# Patient Record
Sex: MALE | HISPANIC OR LATINO | Employment: FULL TIME | URBAN - METROPOLITAN AREA
[De-identification: names, ages, dates, MRNs, and addresses within clinical notes are randomized per-mention and may not be internally consistent; named-entity substitution may affect disease eponyms.]

---

## 2017-12-21 ENCOUNTER — HOSPITAL ENCOUNTER (EMERGENCY)
Facility: MEDICAL CENTER | Age: 27
End: 2017-12-21
Attending: EMERGENCY MEDICINE
Payer: COMMERCIAL

## 2017-12-21 VITALS
TEMPERATURE: 98.3 F | BODY MASS INDEX: 25.37 KG/M2 | HEART RATE: 60 BPM | OXYGEN SATURATION: 100 % | DIASTOLIC BLOOD PRESSURE: 50 MMHG | RESPIRATION RATE: 18 BRPM | SYSTOLIC BLOOD PRESSURE: 126 MMHG | HEIGHT: 71 IN | WEIGHT: 181.22 LBS

## 2017-12-21 DIAGNOSIS — R20.2 TINGLING: ICD-10-CM

## 2017-12-21 DIAGNOSIS — J01.10 ACUTE FRONTAL SINUSITIS, RECURRENCE NOT SPECIFIED: ICD-10-CM

## 2017-12-21 PROCEDURE — 99283 EMERGENCY DEPT VISIT LOW MDM: CPT

## 2017-12-21 RX ORDER — AZITHROMYCIN 250 MG/1
TABLET, FILM COATED ORAL
Qty: 6 TAB | Refills: 0 | Status: SHIPPED | OUTPATIENT
Start: 2017-12-21 | End: 2017-12-27

## 2017-12-21 NOTE — ED PROVIDER NOTES
ED Provider Note    Scribed for Saúl Torres M.D. by Edilma Pickard. 12/21/2017  2:52 PM    Primary care provider: Pcp Pt states none  Means of arrival: Walk-in  History obtained from: Patient  History limited by: None    CHIEF COMPLAINT  Chief Complaint   Patient presents with   • Congestion     nasal and sinus   • Tingling       HPI  Tushar Celis Jr. is a 27 y.o. male who presents to the Emergency Department for nasal and sinus congestion onset 8 months ago. The patient reports using sinus rinses without any relief, but has not been taking any OTC medications. He reports of experiencing intermittent left-sided facial tingling after eating. He also reports of changes in smell and intermittent loss of smell to the left side and dizziness when standing up too quickly. He denies any denies headache or facial pain. The patient has his first appointment with PCP in February 2018.     REVIEW OF SYSTEMS  Pertinent positives include congestion, left-sided facial tingling, loss of smell, and dizziness. Pertinent negatives include no headache or facial pain.  E.       PAST MEDICAL HISTORY  The patient has no chronic medical history.    SURGICAL HISTORY  patient denies any surgical history    SOCIAL HISTORY  Social History   Substance Use Topics   • Smoking status: Never Smoker   • Alcohol use No      History   Drug Use No       FAMILY HISTORY  Family History   Problem Relation Age of Onset   • Hypertension Mother        CURRENT MEDICATIONS  No current facility-administered medications for this encounter.     Current Outpatient Prescriptions:   •  azithromycin (ZITHROMAX) 250 MG Tab, Take two tabs by mouth on day one, then one tab by mouth daily on days 2-5., Disp: 6 Tab, Rfl: 0  •  selenium sulfide 2.5% (SELSUN) 2.5 % LOTN, Apply  to affected area(s). Apply to affected area for 10 minutes/day for 7 days., Disp: 1 Bottle, Rfl: 0  •  omeprazole (PRILOSEC) 20 MG CPDR, Take 1 Cap by mouth every day., Disp: 30 Cap, Rfl:  "2    ALLERGIES  No Known Allergies    PHYSICAL EXAM  VITAL SIGNS: /50   Pulse 60   Temp 36.8 °C (98.3 °F) (Temporal)   Resp 18   Ht 1.803 m (5' 11\")   Wt 82.2 kg (181 lb 3.5 oz)   SpO2 100%   BMI 25.27 kg/m²     Constitutional: Well developed, Well nourished, no distress, Non-toxic appearance.   HENT: Normocephalic, Atraumatic. No sinus pressure. Bilateral TM normal. Oropharynx moist with posterior nasal drainage.   Eyes: PERRL, EOMI, Conjunctiva normal, No discharge.   CV: Good pulses  Thorax & Lungs: No respiratory distress.   Skin: Warm, Dry, No erythema, No rash.    Musculoskeletal: No major deformities noted.   Neurologic: Awake, alert. Moves all extremities spontaneously.  Psychiatric: Affect normal, Mood normal.    COURSE & MEDICAL DECISION MAKING  Nursing notes, VS, PMSFHx reviewed in chart.    3:13 PM - Patient seen and examined at bedside. Recommended the patient take Afrin and Claritin for symptomatic relief. He will be given a prescription for Zithromax. The patient will be discharged and should return if symptoms worsen or if new symptoms arise. The patient understands and agrees to plan.      3:17 PM I consulted with ED  who agreed to help establish the patient a PCP and schedule an MRI-brain.     Decision Making:  Patient with nonspecific nasal congestion, left sided facial and scalp tingling and paresthesias, clinically I believe the patient has a sinusitis with treat the patient with azithromycin, have scheduled the patient for outpatient primary care physician and outpatient MRI due the patient's change in smell and paresthesias to rule out intracranial mass.    The patient will return for new or worsening symptoms and is stable at the time of discharge.    The patient is referred to a primary physician for blood pressure management, diabetic screening, and for all other preventative health concerns.    DISPOSITION:  Patient will be discharged home in stable condition.  FOLLOW " UP:  [facility], Emergency Dept  [address]  [place] [address]  [phone]    If symptoms worsen      OUTPATIENT MEDICATION:  Discharge Medication List as of 12/21/2017  3:26 PM      START taking these medications    Details   azithromycin (ZITHROMAX) 250 MG Tab Take two tabs by mouth on day one, then one tab by mouth daily on days 2-5., Disp-6 Tab, R-0, Normal             FINAL IMPRESSION  1. Acute frontal sinusitis, recurrence not specified    2. Tingling       IEdilma (Scribe), am scribing for, and in the presence of, Saúl Torres M.D..    Electronically signed by: Edilma Pickard (Kaylie), 12/21/2017    ISaúl M.D. personally performed the services described in this documentation, as scribed by Edilma Pickard in my presence, and it is both accurate and complete.    The note accurately reflects work and decisions made by me.  Saúl Torres  12/21/2017  5:07 PM

## 2017-12-21 NOTE — DISCHARGE INSTRUCTIONS
Please follow-up with your primary care provider for blood pressure management.         Sinusitis, Adult  Sinusitis is redness, soreness, and puffiness (inflammation) of the air pockets in the bones of your face (sinuses). The redness, soreness, and puffiness can cause air and mucus to get trapped in your sinuses. This can allow germs to grow and cause an infection.   HOME CARE   · Drink enough fluids to keep your pee (urine) clear or pale yellow.  · Use a humidifier in your home.  · Run a hot shower to create steam in the bathroom. Sit in the bathroom with the door closed. Breathe in the steam 3-4 times a day.  · Put a warm, moist washcloth on your face 3-4 times a day, or as told by your doctor.  · Use salt water sprays (saline sprays) to wet the thick fluid in your nose. This can help the sinuses drain.  · Only take medicine as told by your doctor.  GET HELP RIGHT AWAY IF:   · Your pain gets worse.  · You have very bad headaches.  · You are sick to your stomach (nauseous).  · You throw up (vomit).  · You are very sleepy (drowsy) all the time.  · Your face is puffy (swollen).  · Your vision changes.  · You have a stiff neck.  · You have trouble breathing.  MAKE SURE YOU:   · Understand these instructions.  · Will watch your condition.  · Will get help right away if you are not doing well or get worse.     This information is not intended to replace advice given to you by your health care provider. Make sure you discuss any questions you have with your health care provider.     Document Released: 06/05/2009 Document Revised: 01/08/2016 Document Reviewed: 07/23/2013  [employer] Interactive Patient Education ©2016 [employer]

## 2017-12-21 NOTE — LETTER
Emergency Services     December 21, 2017    Patient: Tushar Celis Jr.   YOB: 1990   Date of Visit: 12/21/2017       To Whom It May Concern:    Tushar Celis was seen and treated in our emergency department on 12/21/2017. He   Is to be excused from work today and tomorrow if needed  .    Sincerely,     MARLY BOLDEN M.D.  [facility], EMERGENCY DEPT  Dept: [phone]

## 2017-12-21 NOTE — ED NOTES
Pt discharged with instructions and scrip x 1  Pt verbalizes the understanding of instructions. Pt ambulated out of ER without any difficulty.

## 2017-12-21 NOTE — ED NOTES
Chief Complaint   Patient presents with   • Congestion     nasal and sinus   • Tingling     Pt states sx for months

## 2017-12-27 ENCOUNTER — OFFICE VISIT (OUTPATIENT)
Dept: MEDICAL GROUP | Facility: MEDICAL CENTER | Age: 27
End: 2017-12-27
Payer: COMMERCIAL

## 2017-12-27 VITALS
TEMPERATURE: 97.4 F | WEIGHT: 181 LBS | HEART RATE: 78 BPM | HEIGHT: 71 IN | RESPIRATION RATE: 16 BRPM | SYSTOLIC BLOOD PRESSURE: 126 MMHG | OXYGEN SATURATION: 96 % | BODY MASS INDEX: 25.34 KG/M2 | DIASTOLIC BLOOD PRESSURE: 72 MMHG

## 2017-12-27 DIAGNOSIS — R63.4 WEIGHT LOSS: ICD-10-CM

## 2017-12-27 DIAGNOSIS — Z11.3 SCREEN FOR STD (SEXUALLY TRANSMITTED DISEASE): ICD-10-CM

## 2017-12-27 DIAGNOSIS — R20.2 TINGLING: ICD-10-CM

## 2017-12-27 DIAGNOSIS — K29.00 ACUTE GASTRITIS WITHOUT HEMORRHAGE, UNSPECIFIED GASTRITIS TYPE: ICD-10-CM

## 2017-12-27 PROCEDURE — 99203 OFFICE O/P NEW LOW 30 MIN: CPT | Performed by: NURSE PRACTITIONER

## 2017-12-27 RX ORDER — OMEPRAZOLE 20 MG/1
20 CAPSULE, DELAYED RELEASE ORAL DAILY
Qty: 21 CAP | Refills: 0 | Status: SHIPPED | OUTPATIENT
Start: 2017-12-27 | End: 2018-01-17

## 2017-12-27 ASSESSMENT — ENCOUNTER SYMPTOMS
TINGLING: 1
HEARTBURN: 1
WEIGHT LOSS: 1

## 2017-12-27 ASSESSMENT — PATIENT HEALTH QUESTIONNAIRE - PHQ9: CLINICAL INTERPRETATION OF PHQ2 SCORE: 0

## 2017-12-27 NOTE — PROGRESS NOTES
"Subjective:      Tushar Celis Jr. is a 27 y.o. male who presents with Establish Care and Hospital Follow-up (brain tingling)            HPI Tushar Celis Jr.Is here today to establish care as well as for hospital follow-up for sinusitis and had tingling as well as concerns about weight loss and digestion issues.      1. Tingling  Patient was seen at the emergency room last week for complaints of tingling on the left side of his head. An MRI was ordered but it is awaiting insurance approval. He states he still has the tingling which comes and goes. He is wearing a hat in the office. He has some sinus congestion and finished a Z-Ivan but has not noticed a difference. He denies vision changes, speech changes, confusion or unilateral weakness. He reports no rash in the area.    2. Weight loss  Patient states he has lost weight over the last year and thinks it is due to not eating as much with his indigestion. Review of his chart shows his weight is actually up from many years ago and he states he was 168 pounds and went as high as 223 pounds but now is at 181 pounds. He denies any bleeding anywhere, abdominal pain, nausea or vomiting.    3. Acute gastritis without hemorrhage, unspecified gastritis type  Patient states he feels like he has a digestion issue which has been present for years. He describes it as \"no air in his stomach\" he also describes it as feeling tight. It comes and goes and he does not know what makes it occur and it will got better on its own. It appears he has been prescribed omeprazole in the past but has not used it. He denies black stools or blood in his stool.    4. Screen for STD (sexually transmitted disease)  Patient states he would like to be screened for STDs although he has not recently been sexually active and has no penile discharge.  Social History   Substance Use Topics   • Smoking status: Never Smoker   • Smokeless tobacco: Never Used   • Alcohol use Yes      Comment: occas. " "    Current Outpatient Prescriptions   Medication Sig Dispense Refill   • omeprazole (PRILOSEC) 20 MG delayed-release capsule Take 1 Cap by mouth every day for 21 days. 21 Cap 0   • selenium sulfide 2.5% (SELSUN) 2.5 % LOTN Apply  to affected area(s). Apply to affected area for 10 minutes/day for 7 days. 1 Bottle 0   • omeprazole (PRILOSEC) 20 MG CPDR Take 1 Cap by mouth every day. 30 Cap 2     No current facility-administered medications for this visit.      Family History   Problem Relation Age of Onset   • Hypertension Mother    • No Known Problems Father    History reviewed. No pertinent past medical history.    Review of Systems   Constitutional: Positive for weight loss.   Gastrointestinal: Positive for heartburn.   Neurological: Positive for tingling.   All other systems reviewed and are negative.         Objective:     /72   Pulse 78   Temp 36.3 °C (97.4 °F)   Resp 16   Ht 1.803 m (5' 11\")   Wt 82.1 kg (181 lb)   SpO2 96%   BMI 25.24 kg/m²      Physical Exam   Constitutional: He is oriented to person, place, and time. He appears well-developed and well-nourished. No distress.   HENT:   Head: Normocephalic and atraumatic.   Right Ear: External ear normal.   Left Ear: External ear normal.   Nose: Nose normal.   Mouth/Throat: Oropharynx is clear and moist.   Eyes: Conjunctivae are normal. Right eye exhibits no discharge. Left eye exhibits no discharge.   Neck: Normal range of motion. Neck supple. No tracheal deviation present. No thyromegaly present.   Cardiovascular: Normal rate, regular rhythm and normal heart sounds.    No murmur heard.  Pulmonary/Chest: Effort normal and breath sounds normal. No respiratory distress. He has no wheezes. He has no rales.   Abdominal: Soft. Bowel sounds are normal. He exhibits no distension and no mass. There is no tenderness. There is no rebound and no guarding. No hernia.   Lymphadenopathy:     He has no cervical adenopathy.   Neurological: He is alert and " oriented to person, place, and time. Coordination normal.   Skin: Skin is warm and dry. No rash noted. He is not diaphoretic. No erythema.   Patient points to the left side of his head is to wear the tingling occurs and he is wearing a wool In the office. No rash noted.   Psychiatric: He has a normal mood and affect. His behavior is normal. Judgment and thought content normal.   Nursing note and vitals reviewed.              Assessment/Plan:     1. Tingling  Patient has an MRI pending and this will be good to rule out sinusitis or other cause of his symptoms. I advised him to stop wearing his wool cap since the pulling on the hair can contribute to tingling. I will advise him further pending results.    2. Weight loss  It is unclear how much weight patient has lost and much of it seems to be due to decreased appetite. His BMI is appropriate. I recommended some lab work and he is doing his MRI. If symptoms continue he may need a CT of the abdomen with contrast next.  - COMP METABOLIC PANEL; Future  - TSH; Future  - CBC WITHOUT DIFFERENTIAL; Future    3. Acute gastritis without hemorrhage, unspecified gastritis type  I will have patient try omeprazole to see if this makes a difference and if it does not, he may need CT and GI referral.  - omeprazole (PRILOSEC) 20 MG delayed-release capsule; Take 1 Cap by mouth every day for 21 days.  Dispense: 21 Cap; Refill: 0    4. Screen for STD (sexually transmitted disease)    - HIV ANTIBODIES; Future  - RPR

## 2017-12-29 ENCOUNTER — HOSPITAL ENCOUNTER (OUTPATIENT)
Dept: LAB | Facility: MEDICAL CENTER | Age: 27
End: 2017-12-29
Attending: NURSE PRACTITIONER
Payer: COMMERCIAL

## 2017-12-29 DIAGNOSIS — Z11.3 SCREEN FOR STD (SEXUALLY TRANSMITTED DISEASE): ICD-10-CM

## 2017-12-29 DIAGNOSIS — R63.4 WEIGHT LOSS: ICD-10-CM

## 2017-12-29 LAB
ALBUMIN SERPL BCP-MCNC: 4.5 G/DL (ref 3.2–4.9)
ALBUMIN/GLOB SERPL: 1.9 G/DL
ALP SERPL-CCNC: 64 U/L (ref 30–99)
ALT SERPL-CCNC: 20 U/L (ref 2–50)
ANION GAP SERPL CALC-SCNC: 6 MMOL/L (ref 0–11.9)
AST SERPL-CCNC: 24 U/L (ref 12–45)
BILIRUB SERPL-MCNC: 0.8 MG/DL (ref 0.1–1.5)
BUN SERPL-MCNC: 10 MG/DL (ref 8–22)
CALCIUM SERPL-MCNC: 9.8 MG/DL (ref 8.5–10.5)
CHLORIDE SERPL-SCNC: 102 MMOL/L (ref 96–112)
CO2 SERPL-SCNC: 28 MMOL/L (ref 20–33)
CREAT SERPL-MCNC: 0.92 MG/DL (ref 0.5–1.4)
ERYTHROCYTE [DISTWIDTH] IN BLOOD BY AUTOMATED COUNT: 39.9 FL (ref 35.9–50)
GFR SERPL CREATININE-BSD FRML MDRD: >60 ML/MIN/1.73 M 2
GLOBULIN SER CALC-MCNC: 2.4 G/DL (ref 1.9–3.5)
GLUCOSE SERPL-MCNC: 93 MG/DL (ref 65–99)
HCT VFR BLD AUTO: 47.5 % (ref 42–52)
HGB BLD-MCNC: 16.6 G/DL (ref 14–18)
HIV 1+2 AB+HIV1 P24 AG SERPL QL IA: NON REACTIVE
MCH RBC QN AUTO: 30.9 PG (ref 27–33)
MCHC RBC AUTO-ENTMCNC: 34.9 G/DL (ref 33.7–35.3)
MCV RBC AUTO: 88.5 FL (ref 81.4–97.8)
PLATELET # BLD AUTO: 186 K/UL (ref 164–446)
PMV BLD AUTO: 11.2 FL (ref 9–12.9)
POTASSIUM SERPL-SCNC: 4.2 MMOL/L (ref 3.6–5.5)
PROT SERPL-MCNC: 6.9 G/DL (ref 6–8.2)
RBC # BLD AUTO: 5.37 M/UL (ref 4.7–6.1)
SODIUM SERPL-SCNC: 136 MMOL/L (ref 135–145)
TREPONEMA PALLIDUM IGG+IGM AB [PRESENCE] IN SERUM OR PLASMA BY IMMUNOASSAY: NON REACTIVE
WBC # BLD AUTO: 7.4 K/UL (ref 4.8–10.8)

## 2017-12-29 PROCEDURE — 36415 COLL VENOUS BLD VENIPUNCTURE: CPT

## 2017-12-29 PROCEDURE — 87389 HIV-1 AG W/HIV-1&-2 AB AG IA: CPT

## 2017-12-29 PROCEDURE — 86780 TREPONEMA PALLIDUM: CPT

## 2017-12-29 PROCEDURE — 80053 COMPREHEN METABOLIC PANEL: CPT

## 2017-12-29 PROCEDURE — 84443 ASSAY THYROID STIM HORMONE: CPT

## 2017-12-29 PROCEDURE — 85027 COMPLETE CBC AUTOMATED: CPT

## 2017-12-30 LAB — TSH SERPL DL<=0.005 MIU/L-ACNC: 3.13 UIU/ML (ref 0.38–5.33)

## 2018-01-19 ENCOUNTER — TELEPHONE (OUTPATIENT)
Dept: MEDICAL GROUP | Facility: MEDICAL CENTER | Age: 28
End: 2018-01-19

## 2018-01-19 DIAGNOSIS — R19.5 ABNORMAL STOOL TEST: ICD-10-CM

## 2018-01-19 NOTE — TELEPHONE ENCOUNTER
"1. Caller Name: Tushar Celis                 Call Back [phone]    Patient called, he said he would like you to order a stool testing because he notice something in his stool that he says is \"not normal stool\" and he is concerned that a microorganism is eating him alive from the inside.... Also he said he still feel the tingling in his brain and would like more antibiotic to be sent to his pharmacy.        Patient approves a detailed voicemail message: N\A    "

## 2018-01-19 NOTE — TELEPHONE ENCOUNTER
I have ordered stool testing and he needs to go to the laboratory to  the stool kit and complete the test. I did not refill Zithromax or any other antibiotic. If he feels he is still ill he will need to go back to urgent care.

## 2018-01-22 ENCOUNTER — OFFICE VISIT (OUTPATIENT)
Dept: MEDICAL GROUP | Facility: MEDICAL CENTER | Age: 28
End: 2018-01-22
Payer: COMMERCIAL

## 2018-01-22 VITALS
BODY MASS INDEX: 25.34 KG/M2 | WEIGHT: 181 LBS | DIASTOLIC BLOOD PRESSURE: 68 MMHG | TEMPERATURE: 97.2 F | RESPIRATION RATE: 16 BRPM | HEART RATE: 72 BPM | SYSTOLIC BLOOD PRESSURE: 124 MMHG | HEIGHT: 71 IN | OXYGEN SATURATION: 97 %

## 2018-01-22 DIAGNOSIS — R10.84 GENERALIZED ABDOMINAL PAIN: ICD-10-CM

## 2018-01-22 PROCEDURE — 99213 OFFICE O/P EST LOW 20 MIN: CPT | Performed by: NURSE PRACTITIONER

## 2018-01-22 ASSESSMENT — ENCOUNTER SYMPTOMS
WEIGHT LOSS: 1
NERVOUS/ANXIOUS: 1
HEARTBURN: 1

## 2018-01-22 NOTE — PROGRESS NOTES
Subjective:      Tushar Celis Jr. is a 28 y.o. male who presents with Follow-Up (worm in stool / white )        CC: Patient is here today for concerns about parasites and abdominal discomfort.    HPI Tushar Celis Jr. was seen here initially after emergency room visit for tingling of the scalp. Nothing specific was found at the emergency room and MRI was ordered but has not been completed as of yet. His main concern today is that he has continued digestion issues for years. Review of chart shows that he had a CT of the abdomen and ultrasound of the abdomen done in 2011 with no findings abnormal. I also did lab work on patient and results of CBC, TSH, CMP were all normal. HIV and syphilis were negative.    Patient is still concerned that he feels like something is eating him up from the inside out. On his last visit he mentioned that it felt like he had no air in his stomach and recently asked for a stool testing because his stool did not appear normal and he was afraid that a microorganism was eating him alive. He states today that he read a large percentage of people have parasites in them. A stool test was ordered for him but it was not completed. He denies fever, chills, nausea or vomiting. Vital signs are normal.        Social History   Substance Use Topics   • Smoking status: Never Smoker   • Smokeless tobacco: Never Used   • Alcohol use Yes      Comment: occas.     Current Outpatient Prescriptions   Medication Sig Dispense Refill   • selenium sulfide 2.5% (SELSUN) 2.5 % LOTN Apply  to affected area(s). Apply to affected area for 10 minutes/day for 7 days. 1 Bottle 0   • omeprazole (PRILOSEC) 20 MG CPDR Take 1 Cap by mouth every day. 30 Cap 2     No current facility-administered medications for this visit.      Family History   Problem Relation Age of Onset   • Hypertension Mother    • No Known Problems Father    History reviewed. No pertinent past medical history.    Review of Systems   Constitutional:  "Positive for malaise/fatigue and weight loss.   Gastrointestinal: Positive for heartburn.   Psychiatric/Behavioral: The patient is nervous/anxious.    All other systems reviewed and are negative.         Objective:     /68   Pulse 72   Temp 36.2 °C (97.2 °F)   Resp 16   Ht 1.803 m (5' 11\")   Wt 82.1 kg (181 lb)   SpO2 97%   BMI 25.24 kg/m²      Physical Exam   Constitutional: He appears well-developed and well-nourished. No distress.   HENT:   Head: Normocephalic and atraumatic.   Right Ear: External ear normal.   Left Ear: External ear normal.   Skin: He is not diaphoretic.   Psychiatric: He has a normal mood and affect. His speech is normal.   Patient appears somewhat anxious and paranoid over his health status.               Assessment/Plan:     1. Generalized abdominal pain  I reviewed with patient that his lab work came back all normal and he seemed disappointed with this and told me that his potassium was high but I reviewed with him the results showing normal potassium. I also reviewed with him his previous CT and ultrasound of the abdomen which were normal although he states he was told that his stomach looked green from his previous test results. I reviewed with him that it would be unusual to see parasites in the stool in this area unless he traveled out of the country or was in a questionable water source but was willing to do stool testing and ultrasound of the abdomen if necessary. He did ask about whether we did alternative medicine which I explained was not something I or my colleagues practice here. He agreed that it would be better for him to go to the [facility] to look into alternative means of testing looking into his complaints. I told him he was welcome back any time for follow-up on his issues from a more traditional medical overview.      "

## 2018-01-22 NOTE — LETTER
January 22, 2018         Patient: Tushar Celis Jr.   YOB: 1990   Date of Visit: 1/22/2018           To Whom it May Concern:    Tushar Celis was seen in my clinic on 1/22/2018. He may return to work on 1/23/18..    If you have any questions or concerns, please don't hesitate to call.        Sincerely,           BILLY Tena.  Electronically Signed

## 2018-01-31 ENCOUNTER — HOSPITAL ENCOUNTER (OUTPATIENT)
Dept: RADIOLOGY | Facility: MEDICAL CENTER | Age: 28
End: 2018-01-31
Attending: EMERGENCY MEDICINE
Payer: COMMERCIAL

## 2018-01-31 ENCOUNTER — OFFICE VISIT (OUTPATIENT)
Dept: MEDICAL GROUP | Facility: MEDICAL CENTER | Age: 28
End: 2018-01-31
Payer: COMMERCIAL

## 2018-01-31 VITALS
BODY MASS INDEX: 23.8 KG/M2 | HEIGHT: 71 IN | WEIGHT: 170 LBS | DIASTOLIC BLOOD PRESSURE: 84 MMHG | OXYGEN SATURATION: 98 % | TEMPERATURE: 98.6 F | HEART RATE: 79 BPM | SYSTOLIC BLOOD PRESSURE: 122 MMHG

## 2018-01-31 DIAGNOSIS — R63.4 WEIGHT LOSS: ICD-10-CM

## 2018-01-31 DIAGNOSIS — R20.2 TINGLING: ICD-10-CM

## 2018-01-31 DIAGNOSIS — R19.7 DIARRHEA, UNSPECIFIED TYPE: ICD-10-CM

## 2018-01-31 PROCEDURE — 70551 MRI BRAIN STEM W/O DYE: CPT

## 2018-01-31 PROCEDURE — 99214 OFFICE O/P EST MOD 30 MIN: CPT | Performed by: PHYSICIAN ASSISTANT

## 2018-01-31 NOTE — PROGRESS NOTES
Subjective:   CC: Tushar Celis Jr. is a 28 y.o. male here today to follow-up on the following concerns and possibly establishing with a new provider:    Patient complains of brain tingling which comes and goes for the past year.  Tingling is over temporal regions of the brain and he gets chills when this happens without any other neurological symptoms such as motor or sensory weakness. Patient was seen for this at the ER and a brain MRI was ordered that patient is going to get done today. Also has been seen with another provider in the past for the same problems. Denies any headaches or change in vision.    Patient also complains of upset stomach and diarrhea on and off for a year. States he used to eat his lettuce without washing and now he is concerned that he has contracted a parasite. States has been eating garlic for the past week to kill the parasite. States he has other weird symptoms such as when he eats he feels more hungry and wants to eat more, however sometimes he eats and he feels full.  Patient has had some weight loss, particularly he has 11 lb wt loss since last office visit (part of it could be change in scales). States his normal weight is around 175- 180. Denies any blood in stool.      Current medicines (including changes today)  Current Outpatient Prescriptions   Medication Sig Dispense Refill   • Probiotic Product (PROBIOTIC-10) Cap Take  by mouth.     • SACCHAROMYCES BOULARDII PO Take  by mouth.     • Iron-Vitamin C (IRON 100/C PO) Take  by mouth.     • selenium sulfide 2.5% (SELSUN) 2.5 % LOTN Apply  to affected area(s). Apply to affected area for 10 minutes/day for 7 days. 1 Bottle 0   • omeprazole (PRILOSEC) 20 MG CPDR Take 1 Cap by mouth every day. 30 Cap 2     No current facility-administered medications for this visit.          Past medical, surgical, family, and social history are reviewed in Epic chart by me today.   Medications and allergies reviewed in Epic chart by me today.  "        ROS   No chest pain, no shortness of breath, no abdominal pain  As documented in history of present illness above     Objective:     Blood pressure 122/84, pulse 79, temperature 37 °C (98.6 °F), height 1.803 m (5' 10.98\"), weight 77.1 kg (170 lb), SpO2 98 %. Body mass index is 23.72 kg/m².   Physical Exam:  Constitutional: Alert, oriented in no acute distress.  Psych: Eye contact is good, changes from subject to subject, calm affect, demonstrate some odd thinking   Lungs: Unlabored respiratory effort, clear to auscultation bilaterally with good excursion, no wheez or rhonci  CV: regular rate and rhythm. No lower extremity edema  Abdomen: soft, nontender, No CVAT  Skin: no lesions in visible areas.    Last lab results were reviewed by me today and discussed w patient.        Assessment and Plan:   The following treatment plan was discussed    1. Tingling  Advised to do the brain MRI    2. Diarrhea, unspecified type  Patient is currently taking a probiotic and a prebiotic,  Possible IBS  Patient has an order to get ova parasite and stool testing  - REFERRAL TO GASTROENTEROLOGY    3. Weight loss  Last lab results were reviewed by me today and discussed w patient.  I am not sure what is causing the recent weight loss.    Patient complains of unspecific symptoms. He demonstrated some odd thinking. States he wants to switch providers because his previous provider told him that he is perfect, states perfect is not acceptable, maybe he is okay but not perfect.    Reassured patient that so far his lab results look normal. Patient is going to do the brain MRI and stool study for ova parasite. Follow-up with gastroenterology.  Patient is going to establish with an M.D.      Followup: Return if symptoms worsen or fail to improve.         Please note that this dictation was created using voice recognition software. I have made every reasonable attempt to correct obvious errors, but I expect that there are errors of " grammar and possibly content that I did not discover before finalizing the note.

## 2018-02-01 ENCOUNTER — HOSPITAL ENCOUNTER (OUTPATIENT)
Facility: MEDICAL CENTER | Age: 28
End: 2018-02-01
Attending: NURSE PRACTITIONER
Payer: COMMERCIAL

## 2018-02-01 DIAGNOSIS — R19.5 ABNORMAL STOOL TEST: ICD-10-CM

## 2018-02-01 PROCEDURE — 87177 OVA AND PARASITES SMEARS: CPT

## 2018-02-08 LAB
O+P SPEC MICRO: NORMAL
SIGNIFICANT IND 70042: NORMAL
SITE SITE: NORMAL
SOURCE SOURCE: NORMAL

## 2018-02-28 DIAGNOSIS — J34.89 NASAL DISCHARGE: ICD-10-CM

## 2018-03-02 DIAGNOSIS — B89 PARASITE INFECTION: ICD-10-CM

## 2018-03-22 ENCOUNTER — APPOINTMENT (OUTPATIENT)
Dept: RADIOLOGY | Facility: MEDICAL CENTER | Age: 28
End: 2018-03-22
Attending: SPECIALIST
Payer: COMMERCIAL

## 2018-05-30 ENCOUNTER — HOSPITAL ENCOUNTER (EMERGENCY)
Facility: MEDICAL CENTER | Age: 28
End: 2018-05-30
Attending: EMERGENCY MEDICINE
Payer: COMMERCIAL

## 2018-05-30 ENCOUNTER — APPOINTMENT (OUTPATIENT)
Dept: RADIOLOGY | Facility: MEDICAL CENTER | Age: 28
End: 2018-05-30
Attending: EMERGENCY MEDICINE
Payer: COMMERCIAL

## 2018-05-30 VITALS
WEIGHT: 175.04 LBS | SYSTOLIC BLOOD PRESSURE: 130 MMHG | DIASTOLIC BLOOD PRESSURE: 75 MMHG | TEMPERATURE: 98 F | OXYGEN SATURATION: 98 % | RESPIRATION RATE: 18 BRPM | BODY MASS INDEX: 24.51 KG/M2 | HEART RATE: 76 BPM | HEIGHT: 71 IN

## 2018-05-30 DIAGNOSIS — R10.32 ABDOMINAL PAIN, LLQ (LEFT LOWER QUADRANT): ICD-10-CM

## 2018-05-30 LAB
ALBUMIN SERPL BCP-MCNC: 4.6 G/DL (ref 3.2–4.9)
ALBUMIN/GLOB SERPL: 1.7 G/DL
ALP SERPL-CCNC: 87 U/L (ref 30–99)
ALT SERPL-CCNC: 16 U/L (ref 2–50)
AMPHET UR QL SCN: NEGATIVE
ANION GAP SERPL CALC-SCNC: 9 MMOL/L (ref 0–11.9)
APPEARANCE UR: CLEAR
AST SERPL-CCNC: 15 U/L (ref 12–45)
BARBITURATES UR QL SCN: NEGATIVE
BASOPHILS # BLD AUTO: 0.3 % (ref 0–1.8)
BASOPHILS # BLD: 0.02 K/UL (ref 0–0.12)
BENZODIAZ UR QL SCN: NEGATIVE
BILIRUB SERPL-MCNC: 0.7 MG/DL (ref 0.1–1.5)
BILIRUB UR QL STRIP.AUTO: NEGATIVE
BUN SERPL-MCNC: 23 MG/DL (ref 8–22)
BZE UR QL SCN: NEGATIVE
CALCIUM SERPL-MCNC: 9.5 MG/DL (ref 8.5–10.5)
CANNABINOIDS UR QL SCN: POSITIVE
CHLORIDE SERPL-SCNC: 104 MMOL/L (ref 96–112)
CO2 SERPL-SCNC: 24 MMOL/L (ref 20–33)
COLOR UR: YELLOW
CREAT SERPL-MCNC: 1.11 MG/DL (ref 0.5–1.4)
EOSINOPHIL # BLD AUTO: 0.06 K/UL (ref 0–0.51)
EOSINOPHIL NFR BLD: 0.8 % (ref 0–6.9)
ERYTHROCYTE [DISTWIDTH] IN BLOOD BY AUTOMATED COUNT: 39.9 FL (ref 35.9–50)
GLOBULIN SER CALC-MCNC: 2.7 G/DL (ref 1.9–3.5)
GLUCOSE SERPL-MCNC: 81 MG/DL (ref 65–99)
GLUCOSE UR STRIP.AUTO-MCNC: NEGATIVE MG/DL
HCT VFR BLD AUTO: 47.5 % (ref 42–52)
HGB BLD-MCNC: 16.9 G/DL (ref 14–18)
IMM GRANULOCYTES # BLD AUTO: 0.03 K/UL (ref 0–0.11)
IMM GRANULOCYTES NFR BLD AUTO: 0.4 % (ref 0–0.9)
KETONES UR STRIP.AUTO-MCNC: NEGATIVE MG/DL
LEUKOCYTE ESTERASE UR QL STRIP.AUTO: NEGATIVE
LIPASE SERPL-CCNC: 18 U/L (ref 11–82)
LYMPHOCYTES # BLD AUTO: 2.13 K/UL (ref 1–4.8)
LYMPHOCYTES NFR BLD: 29.1 % (ref 22–41)
MCH RBC QN AUTO: 30.9 PG (ref 27–33)
MCHC RBC AUTO-ENTMCNC: 35.6 G/DL (ref 33.7–35.3)
MCV RBC AUTO: 86.8 FL (ref 81.4–97.8)
METHADONE UR QL SCN: NEGATIVE
MICRO URNS: NORMAL
MONOCYTES # BLD AUTO: 0.85 K/UL (ref 0–0.85)
MONOCYTES NFR BLD AUTO: 11.6 % (ref 0–13.4)
NEUTROPHILS # BLD AUTO: 4.24 K/UL (ref 1.82–7.42)
NEUTROPHILS NFR BLD: 57.8 % (ref 44–72)
NITRITE UR QL STRIP.AUTO: NEGATIVE
NRBC # BLD AUTO: 0 K/UL
NRBC BLD-RTO: 0 /100 WBC
OPIATES UR QL SCN: NEGATIVE
OXYCODONE UR QL SCN: NEGATIVE
PCP UR QL SCN: NEGATIVE
PH UR STRIP.AUTO: 5 [PH]
PLATELET # BLD AUTO: 211 K/UL (ref 164–446)
PMV BLD AUTO: 10.9 FL (ref 9–12.9)
POTASSIUM SERPL-SCNC: 4 MMOL/L (ref 3.6–5.5)
PROPOXYPH UR QL SCN: NEGATIVE
PROT SERPL-MCNC: 7.3 G/DL (ref 6–8.2)
PROT UR QL STRIP: NEGATIVE MG/DL
RBC # BLD AUTO: 5.47 M/UL (ref 4.7–6.1)
RBC UR QL AUTO: NEGATIVE
SODIUM SERPL-SCNC: 137 MMOL/L (ref 135–145)
SP GR UR STRIP.AUTO: 1.03
UROBILINOGEN UR STRIP.AUTO-MCNC: 0.2 MG/DL
WBC # BLD AUTO: 7.3 K/UL (ref 4.8–10.8)

## 2018-05-30 PROCEDURE — 80053 COMPREHEN METABOLIC PANEL: CPT

## 2018-05-30 PROCEDURE — 80307 DRUG TEST PRSMV CHEM ANLYZR: CPT

## 2018-05-30 PROCEDURE — 81003 URINALYSIS AUTO W/O SCOPE: CPT

## 2018-05-30 PROCEDURE — 99284 EMERGENCY DEPT VISIT MOD MDM: CPT

## 2018-05-30 PROCEDURE — 700105 HCHG RX REV CODE 258: Performed by: EMERGENCY MEDICINE

## 2018-05-30 PROCEDURE — 85025 COMPLETE CBC W/AUTO DIFF WBC: CPT

## 2018-05-30 PROCEDURE — 36415 COLL VENOUS BLD VENIPUNCTURE: CPT

## 2018-05-30 PROCEDURE — 700117 HCHG RX CONTRAST REV CODE 255: Performed by: EMERGENCY MEDICINE

## 2018-05-30 PROCEDURE — 83690 ASSAY OF LIPASE: CPT

## 2018-05-30 PROCEDURE — 74177 CT ABD & PELVIS W/CONTRAST: CPT

## 2018-05-30 RX ORDER — SODIUM CHLORIDE 9 MG/ML
1000 INJECTION, SOLUTION INTRAVENOUS ONCE
Status: COMPLETED | OUTPATIENT
Start: 2018-05-30 | End: 2018-05-30

## 2018-05-30 RX ADMIN — SODIUM CHLORIDE 1000 ML: 9 INJECTION, SOLUTION INTRAVENOUS at 07:30

## 2018-05-30 RX ADMIN — IOHEXOL 100 ML: 350 INJECTION, SOLUTION INTRAVENOUS at 08:33

## 2018-05-30 ASSESSMENT — PAIN SCALES - GENERAL
PAINLEVEL_OUTOF10: 10
PAINLEVEL_OUTOF10: 10

## 2018-05-30 NOTE — ED TRIAGE NOTES
"Tushar Celis Jr.  28 y.o.  Chief Complaint   Patient presents with   • LLQ Pain     x weeks, \"it feels sticky\"   • Loss of Appetite     concurrent with pain   • Constipation     LBM 5/27/18     Ambulatory to triage with steady gait for above. Currently rates pain 10/10. No behavioral pain indicators noted.    Denies N/V/D. Asking in triage \"what would be the possibility of getting a CT right now? If they're not going to give me a CT there's no point in me being here.\"    Patient has complained of diarrhea to his PCP in the past - was referred to a Gastroenterologist, last visit 2-3 months ago. Was told to call if any symptoms worsened - states that he did not call today.    Triage process explained to patient, apologized for wait time, and returned to lobby.  "

## 2018-05-30 NOTE — DISCHARGE INSTRUCTIONS
Please follow up with a primary physician for blood pressure management, diabetic screening, and all other preventive health concerns.      Acute Pain, Adult  Acute pain is a type of pain that may last for just a few days or as long as six months. It is often related to an illness, injury, or medical procedure. Acute pain may be mild, moderate, or severe. It usually goes away once your injury has healed or you are no longer ill.  Pain can make it hard for you to do daily activities. It can cause anxiety and lead to other problems if left untreated. Treatment depends on the cause and severity of your acute pain.  Follow these instructions at home:  · Check your pain level as told by your health care provider.  · Take over-the-counter and prescription medicines only as told by your health care provider.  · If you are taking prescription pain medicine:  ¨ Ask your health care provider about taking a stool softener or laxative to prevent constipation.  ¨ Do not stop taking the medicine suddenly. Talk to your health care provider about how and when to discontinue prescription pain medicine.  ¨ If your pain is severe, do not take more pills than instructed by your health care provider.  ¨ Do not take other over-the-counter pain medicines in addition to this medicine unless told by your health care provider.  ¨ Do not drive or operate heavy machinery while taking prescription pain medicine.  · Apply ice or heat as told by your health care provider. These may reduce swelling and pain.  · Ask your health care provider if other strategies such as distraction, relaxation, or physical therapies can help your pain.  · Keep all follow-up visits as told by your health care provider. This is important.  Contact a health care provider if:  · You have pain that is not controlled by medicine.  · Your pain does not improve or gets worse.  · You have side effects from pain medicines, such as vomiting or confusion.  Get help right away  if:  · You have severe pain.  · You have trouble breathing.  · You lose consciousness.  · You have chest pain or pressure that lasts for more than a few minutes. Along with the chest pain you may:  ¨ Have pain or discomfort in one or both arms, your back, neck, jaw, or stomach.  ¨ Have shortness of breath.  ¨ Break out in a cold sweat.  ¨ Feel nauseous.  ¨ Become light-headed.  These symptoms may represent a serious problem that is an emergency. Do not wait to see if the symptoms will go away. Get medical help right away. Call your local emergency services (911 in the [place] Do not drive yourself to the hospital.   This information is not intended to replace advice given to you by your health care provider. Make sure you discuss any questions you have with your health care provider.  Document Released: 01/01/2017 Document Revised: 05/26/2017 Document Reviewed: 01/01/2017  [employer]vier Interactive Patient Education © 2017 [employer]

## 2018-06-15 ENCOUNTER — TELEPHONE (OUTPATIENT)
Dept: MEDICAL GROUP | Facility: MEDICAL CENTER | Age: 28
End: 2018-06-15

## 2018-06-29 ENCOUNTER — OFFICE VISIT (OUTPATIENT)
Dept: MEDICAL GROUP | Facility: MEDICAL CENTER | Age: 28
End: 2018-06-29
Payer: COMMERCIAL

## 2018-06-29 VITALS
WEIGHT: 181.22 LBS | HEIGHT: 71 IN | BODY MASS INDEX: 25.37 KG/M2 | TEMPERATURE: 98.7 F | SYSTOLIC BLOOD PRESSURE: 110 MMHG | HEART RATE: 74 BPM | OXYGEN SATURATION: 96 % | DIASTOLIC BLOOD PRESSURE: 70 MMHG

## 2018-06-29 DIAGNOSIS — F20.9 SCHIZOPHRENIA, UNSPECIFIED TYPE (HCC): ICD-10-CM

## 2018-06-29 PROCEDURE — 99214 OFFICE O/P EST MOD 30 MIN: CPT | Performed by: PHYSICIAN ASSISTANT

## 2018-06-29 NOTE — LETTER
June 29, 2018         Patient: Tushar Celis Jr.   YOB: 1990   Date of Visit: 6/29/2018           To Whom it May Concern:    Tushar Celis was seen in my clinic on 6/29/2018. Please excuse his absence from work today.    If you have any questions or concerns, please don't hesitate to call.        Sincerely,           Conchita Eagle P.A.-C.  Electronically Signed

## 2018-06-29 NOTE — PROGRESS NOTES
"Chief Complaint   Patient presents with   • Other     Discuss health        HPI  Tushar Celis Jr. is a 28 y.o. male here today for follow-up on her health issues.  Patient has been demonstrating odd thinking and unusual complaints.  She has been complaining that he has parasites eating him from inside out and that he has parasites coming out of his brain.  He has been in the ER with normal abdominal CT scan and brain MRI did not show any significant abnormal findings.  All his blood work has came back normal so far without any abnormal findings.  Feet stool parasite and ova has also been normal.        History Short I requested patient to call with a family member, he is today he is here today with his mom.  I explained his situation to mom and suggested that he might be having a psych issue such as schizophrenia.  Had a long discussion with mom and with him and trying to explain and pointing out why his thought processes is unusual.  For example today he was pointing to the corner of his eye and telling me to look at all the spots he have.  I had mom to also look at the area we both did not see any spots.      Past medical, surgical, family, and social history is reviewed in Epic chart by me today.   Medications and allergies reviewed and updated in Epic chart by me today.     ROS:   As documented in history of present illness above    Exam:  Blood pressure 110/70, pulse 74, temperature 37.1 °C (98.7 °F), height 1.803 m (5' 10.98\"), weight 82.2 kg (181 lb 3.5 oz), SpO2 96 %.  Constitutional: Alert, no distress, plus 3 vital signs  Skin:  Warm, dry, no rashes invisible areas  Eye: Equal, round and reactive, conjunctiva clear  Psych: Alert, pleasant, well-groomed, normal affect    Last lab results were reviewed by me today and discussed w patient.    A/P:  1. Schizophrenia, unspecified type (HCC)  Had a long discussion with him and his mom explained all blood work to him again patient was not convinced and said we " are just doctors and he believes in chemistry and he is going to treat himself with a garlic and with clean diet and cutting back on sugar.    I told him that if he truly thinks he has a parasite eating him from inside out then he should follow-up with infectious disease because they will be able to diagnose and treat him appropriately they also requested him to see our behavioral health.      - REFERRAL TO BEHAVIORAL HEALTH    Total 30 min was spend with patient face to face with more than half spent on counseling, education and outlining a plan of treatment and coordination of care for the above conditions. This included but was not limited to discussion of medication options and potential risks related to the medications, referral and specialty care options.

## 2019-01-16 ENCOUNTER — NON-PROVIDER VISIT (OUTPATIENT)
Dept: URGENT CARE | Facility: PHYSICIAN GROUP | Age: 29
End: 2019-01-16

## 2019-01-16 DIAGNOSIS — Z02.1 PRE-EMPLOYMENT DRUG SCREENING: ICD-10-CM

## 2019-01-16 LAB
AMP AMPHETAMINE: NORMAL
COC COCAINE: NORMAL
INT CON NEG: NEGATIVE
INT CON POS: POSITIVE
MET METHAMPHETAMINES: NORMAL
OPI OPIATES: NORMAL
PCP PHENCYCLIDINE: NORMAL
POC DRUG COMMENT 753798-OCCUPATIONAL HEALTH: NEGATIVE
THC: NORMAL

## 2019-01-16 PROCEDURE — 80305 DRUG TEST PRSMV DIR OPT OBS: CPT | Performed by: PHYSICIAN ASSISTANT

## 2025-03-13 ENCOUNTER — NON-PROVIDER VISIT (OUTPATIENT)
Dept: OCCUPATIONAL MEDICINE | Facility: CLINIC | Age: 35
End: 2025-03-13

## 2025-03-13 PROCEDURE — 8907 PR URINE COLLECT ONLY: Performed by: PREVENTIVE MEDICINE

## 2025-04-03 ENCOUNTER — OFFICE VISIT (OUTPATIENT)
Dept: OCCUPATIONAL MEDICINE | Facility: CLINIC | Age: 35
End: 2025-04-03

## 2025-04-03 DIAGNOSIS — Z02.89 ENCOUNTER FOR OCCUPATIONAL HEALTH EXAMINATION: Primary | ICD-10-CM

## 2025-04-03 DIAGNOSIS — Z02.4 ENCOUNTER FOR COMMERCIAL DRIVER MEDICAL EXAMINATION (CDME): ICD-10-CM

## 2025-04-03 PROCEDURE — 7100 PR DOT PHYSICAL: Performed by: PREVENTIVE MEDICINE
